# Patient Record
(demographics unavailable — no encounter records)

---

## 2024-11-05 NOTE — HISTORY OF PRESENT ILLNESS
[EENT/Resp Symptoms] : EENT/RESPIRATORY SYMPTOMS [Nasal congestion] : nasal congestion [Fever] : fever [Ear Pain] : ear pain [Nasal Congestion] : nasal congestion [Cough] : cough [Max Temp: ____] : Max temperature: [unfilled]

## 2024-11-05 NOTE — PHYSICAL EXAM
[Erythema] : erythema [Purulent Effusion] : purulent effusion [Mucoid Discharge] : mucoid discharge [Inflamed Nasal Mucosa] : inflamed nasal mucosa [NL] : warm, clear

## 2024-11-05 NOTE — DISCUSSION/SUMMARY
[FreeTextEntry1] : Complete 10 days of antibiotic. Provide ibuprofen as needed for pain or fever. If no improvement within 48 hours return for re-evaluation. Follow up in 2-3 wks

## 2025-01-28 NOTE — HISTORY OF PRESENT ILLNESS
[de-identified] : fever [FreeTextEntry6] : Humberto presents with fever and coughing. The fever started on Friday, was absent on Saturday, and then returned on Sunday and Monday. The patient also experiences chills and body aches. Child is in school but his father has been sick with fever and bodyaches.

## 2025-01-28 NOTE — COUNSELING
[Use of Plain Language] : use of plain language [Adequate] : adequate [None] : none [] : I have reviewed management goals with caretaker and provided a copy of care plan none

## 2025-01-28 NOTE — DISCUSSION/SUMMARY
[FreeTextEntry1] : Objective:   - **Diagnostic Results:** Rapid flu test performed in the office was negative. A send-out test was also conducted, with results expected the following day.  Assessment and Plan:   - **Viral Upper Respiratory Infection:** Based on the presenting symptoms of fever, coughing, chills, and body aches, along with the negative rapid flu test, a viral upper respiratory infection is the most likely diagnosis.     - Therapeutic Interventions: Recommend Motrin (ibuprofen) for fever and pain relief      - Diagnostic Tests: Await results of the send-out flu test      - Patient Education: Advised on symptomatic management and the importance of rest and hydration      - Follow-Up: The doctor will call back with the send-out test results the next day

## 2025-07-04 NOTE — PHYSICAL EXAM

## 2025-07-04 NOTE — DEVELOPMENTAL MILESTONES
[Yes: _______] : yes, [unfilled] [Spreads with a knife] : does not spread with a knife [Dresses and undresses without help] : does not dress and undress without help [Goes to the bathroom independently] : goes to bathroom independently [Is dry through the day] :  is dry through the day [Plays and interacts with peer] : plays and interacts with peer [Answers "why" questions] : answers "why" questions [Tells a story of 2 sentences or more] : does not tell a story of 2 sentences or more [Follows directions for 4 individual] : follows directions for 4 individual prepositions [Counts 5 objects] : counts 5 objects [Names 3 or more numbers] : names 3 or more numbers [Names 4 or more letters out of order] : names 4 or more letters out of order [Is beginning to skip] : is beginning to skip [Walks on tiptoes when asked] : walks on tiptoes when asked [Catches a bounced ball with] : catches a bounced ball with 2 hands [Copies a triangle] : does not copy a triangle [Draws a 6-part person] : does not draw a 6-part person [Copies first name] : copies first name [Cuts well with scissors] : does not cut well with scissors [Writes 2 or more letters] : does not write 2 or more letters [FreeTextEntry1] : recieves special education